# Patient Record
Sex: FEMALE | Race: BLACK OR AFRICAN AMERICAN | NOT HISPANIC OR LATINO | Employment: PART TIME | URBAN - METROPOLITAN AREA
[De-identification: names, ages, dates, MRNs, and addresses within clinical notes are randomized per-mention and may not be internally consistent; named-entity substitution may affect disease eponyms.]

---

## 2021-09-10 PROBLEM — N83.201 RIGHT OVARIAN CYST: Status: ACTIVE | Noted: 2021-09-10

## 2021-09-10 PROBLEM — J45.909 ASTHMA: Status: ACTIVE | Noted: 2021-09-10

## 2021-09-10 PROBLEM — D64.9 ANEMIA: Status: ACTIVE | Noted: 2021-09-10

## 2021-09-10 PROBLEM — Z98.891 H/O: C-SECTION: Status: ACTIVE | Noted: 2021-09-10

## 2021-09-10 PROBLEM — N93.9 ABNORMAL UTERINE BLEEDING (AUB): Status: ACTIVE | Noted: 2021-09-10

## 2021-11-04 PROBLEM — E66.9 OBESITY: Status: ACTIVE | Noted: 2021-11-04

## 2021-11-04 PROBLEM — I10 CHRONIC HYPERTENSION: Status: ACTIVE | Noted: 2021-11-04

## 2022-03-25 PROBLEM — D50.9 IRON DEFICIENCY ANEMIA: Status: ACTIVE | Noted: 2021-09-10

## 2023-05-16 ENCOUNTER — PATIENT OUTREACH (OUTPATIENT)
Dept: ADMINISTRATIVE | Facility: OTHER | Age: 33
End: 2023-05-16

## 2023-05-16 NOTE — PROGRESS NOTES
CHW - Initial Contact    This Community Health Worker completed the Social Determinant of Health questionnaire with patient during clinic visit today.    Pt identified barriers of most importance are: patient identified no barriers of importance during clinic visit    Referrals to community agencies completed with patient consent outside of [facility] include: No community referral needed during clinic visit   Referrals were put through [facility] - no:   Support and Services: No support services needed during clinic visit  Other information discussed the patient needs / wants help with: patient discussed no other information during clinic visit   Follow up required: Yes  No future outreach task assigned

## 2023-06-01 ENCOUNTER — PATIENT OUTREACH (OUTPATIENT)
Dept: ADMINISTRATIVE | Facility: OTHER | Age: 33
End: 2023-06-01

## 2023-06-01 NOTE — PROGRESS NOTES
CHW - Follow Up    This Community Health Worker completed a follow up visit with patient via telephone today.  Pt reported: patient reported she is doing okay no assistance is needed during phone interview.   Will reach out if assistance is needed in the future.   Community Health Worker provided: CHW spoke with patient she is dong okay.  Follow up required: No  No future outreach task assigned     CHW - Case Closure    This Community Health Worker spoke to patient via telephone today.   Pt reported: patient reported she is doing okay no assistance is needed during phone interview.   Will reach out if assistance is needed in the future.   Pt denied any additional needs at this time and agrees with episode closure at this time.  Provided patient with Community Health Worker's contact information and encouraged her to contact this Community Health Worker if additional needs arise.